# Patient Record
Sex: FEMALE | Race: WHITE | HISPANIC OR LATINO
[De-identification: names, ages, dates, MRNs, and addresses within clinical notes are randomized per-mention and may not be internally consistent; named-entity substitution may affect disease eponyms.]

---

## 2017-02-02 ENCOUNTER — FORM ENCOUNTER (OUTPATIENT)
Age: 47
End: 2017-02-02

## 2017-02-03 ENCOUNTER — OUTPATIENT (OUTPATIENT)
Dept: OUTPATIENT SERVICES | Facility: HOSPITAL | Age: 47
LOS: 1 days | End: 2017-02-03
Payer: COMMERCIAL

## 2017-02-03 DIAGNOSIS — Z98.89 OTHER SPECIFIED POSTPROCEDURAL STATES: Chronic | ICD-10-CM

## 2017-02-03 PROCEDURE — 77059 MRI BREAST BILATERAL: CPT | Mod: 26

## 2017-02-03 PROCEDURE — 0159T: CPT | Mod: 26

## 2017-02-03 PROCEDURE — C8937: CPT

## 2017-02-03 PROCEDURE — 77049 MRI BREAST C-+ W/CAD BI: CPT

## 2017-02-03 PROCEDURE — A9579: CPT

## 2017-02-03 PROCEDURE — 77065 DX MAMMO INCL CAD UNI: CPT

## 2017-02-03 PROCEDURE — G0206: CPT | Mod: 26

## 2017-02-07 ENCOUNTER — APPOINTMENT (OUTPATIENT)
Dept: PSYCHIATRY | Facility: CLINIC | Age: 47
End: 2017-02-07

## 2017-02-07 ENCOUNTER — OTHER (OUTPATIENT)
Age: 47
End: 2017-02-07

## 2017-02-08 ENCOUNTER — RESULT REVIEW (OUTPATIENT)
Age: 47
End: 2017-02-08

## 2017-02-08 ENCOUNTER — FORM ENCOUNTER (OUTPATIENT)
Age: 47
End: 2017-02-08

## 2017-02-09 ENCOUNTER — OUTPATIENT (OUTPATIENT)
Dept: OUTPATIENT SERVICES | Facility: HOSPITAL | Age: 47
LOS: 1 days | End: 2017-02-09
Payer: COMMERCIAL

## 2017-02-09 DIAGNOSIS — Z98.89 OTHER SPECIFIED POSTPROCEDURAL STATES: Chronic | ICD-10-CM

## 2017-02-09 PROCEDURE — 19083 BX BREAST 1ST LESION US IMAG: CPT

## 2017-02-09 PROCEDURE — 76642 ULTRASOUND BREAST LIMITED: CPT

## 2017-02-09 PROCEDURE — 88305 TISSUE EXAM BY PATHOLOGIST: CPT

## 2017-02-09 PROCEDURE — 76642 ULTRASOUND BREAST LIMITED: CPT | Mod: 26,RT

## 2017-02-09 PROCEDURE — A4648: CPT

## 2017-02-09 PROCEDURE — 19083 BX BREAST 1ST LESION US IMAG: CPT | Mod: RT

## 2017-02-09 PROCEDURE — 77065 DX MAMMO INCL CAD UNI: CPT

## 2017-02-09 PROCEDURE — G0206: CPT | Mod: 26

## 2017-02-10 LAB — SURGICAL PATHOLOGY STUDY: SIGNIFICANT CHANGE UP

## 2017-03-02 ENCOUNTER — APPOINTMENT (OUTPATIENT)
Dept: NEUROLOGY | Facility: CLINIC | Age: 47
End: 2017-03-02

## 2017-03-02 VITALS
DIASTOLIC BLOOD PRESSURE: 81 MMHG | BODY MASS INDEX: 26.46 KG/M2 | OXYGEN SATURATION: 98 % | HEART RATE: 101 BPM | TEMPERATURE: 97.3 F | WEIGHT: 155 LBS | SYSTOLIC BLOOD PRESSURE: 137 MMHG | HEIGHT: 64 IN

## 2017-03-02 DIAGNOSIS — Z80.9 FAMILY HISTORY OF MALIGNANT NEOPLASM, UNSPECIFIED: ICD-10-CM

## 2017-03-02 DIAGNOSIS — Z87.19 PERSONAL HISTORY OF OTHER DISEASES OF THE DIGESTIVE SYSTEM: ICD-10-CM

## 2017-03-02 DIAGNOSIS — Z82.49 FAMILY HISTORY OF ISCHEMIC HEART DISEASE AND OTHER DISEASES OF THE CIRCULATORY SYSTEM: ICD-10-CM

## 2017-03-02 DIAGNOSIS — Z86.59 PERSONAL HISTORY OF OTHER MENTAL AND BEHAVIORAL DISORDERS: ICD-10-CM

## 2017-03-02 DIAGNOSIS — Z87.39 PERSONAL HISTORY OF OTHER DISEASES OF THE MUSCULOSKELETAL SYSTEM AND CONNECTIVE TISSUE: ICD-10-CM

## 2017-03-02 DIAGNOSIS — M54.2 CERVICALGIA: ICD-10-CM

## 2017-03-02 DIAGNOSIS — N60.92 UNSPECIFIED BENIGN MAMMARY DYSPLASIA OF LEFT BREAST: ICD-10-CM

## 2017-03-02 DIAGNOSIS — N63 UNSPECIFIED LUMP IN BREAST: ICD-10-CM

## 2017-03-02 DIAGNOSIS — Z80.42 FAMILY HISTORY OF MALIGNANT NEOPLASM OF PROSTATE: ICD-10-CM

## 2017-03-02 DIAGNOSIS — R20.2 PARESTHESIA OF SKIN: ICD-10-CM

## 2017-03-02 DIAGNOSIS — R20.0 ANESTHESIA OF SKIN: ICD-10-CM

## 2017-03-02 DIAGNOSIS — Z80.0 FAMILY HISTORY OF MALIGNANT NEOPLASM OF DIGESTIVE ORGANS: ICD-10-CM

## 2017-03-02 DIAGNOSIS — M79.605 PAIN IN LEFT LEG: ICD-10-CM

## 2017-03-02 RX ORDER — BUPROPION HYDROCHLORIDE 150 MG/1
150 TABLET, EXTENDED RELEASE ORAL
Qty: 180 | Refills: 0 | Status: DISCONTINUED | COMMUNITY
Start: 2016-07-18

## 2017-03-03 LAB
ALBUMIN MFR SERPL ELPH: 58.3 %
ALBUMIN SERPL-MCNC: 4.5 G/DL
ALBUMIN/GLOB SERPL: 1.4 RATIO
ALPHA1 GLOB MFR SERPL ELPH: 3.5 %
ALPHA1 GLOB SERPL ELPH-MCNC: 0.3 G/DL
ALPHA2 GLOB MFR SERPL ELPH: 9 %
ALPHA2 GLOB SERPL ELPH-MCNC: 0.7 G/DL
B-GLOBULIN MFR SERPL ELPH: 11.3 %
B-GLOBULIN SERPL ELPH-MCNC: 0.9 G/DL
ENA SS-A AB SER IA-ACNC: <0.2 AL
ENA SS-B AB SER IA-ACNC: 1 AL
GAMMA GLOB FLD ELPH-MCNC: 1.4 G/DL
GAMMA GLOB MFR SERPL ELPH: 17.9 %
INTERPRETATION SERPL IEP-IMP: NORMAL
PROT SERPL-MCNC: 7.8 G/DL
PROT SERPL-MCNC: 7.8 G/DL
RPR SER-TITR: NORMAL

## 2017-03-05 LAB — HBA1C MFR BLD HPLC: 5.5 %

## 2017-03-06 LAB — VIT B1 SERPL-MCNC: 249.6 NMOL/L

## 2017-04-10 ENCOUNTER — APPOINTMENT (OUTPATIENT)
Dept: DERMATOLOGY | Facility: CLINIC | Age: 47
End: 2017-04-10

## 2017-04-10 VITALS
WEIGHT: 155 LBS | SYSTOLIC BLOOD PRESSURE: 110 MMHG | DIASTOLIC BLOOD PRESSURE: 70 MMHG | BODY MASS INDEX: 26.46 KG/M2 | HEIGHT: 64 IN

## 2017-04-10 DIAGNOSIS — D22.9 MELANOCYTIC NEVI, UNSPECIFIED: ICD-10-CM

## 2017-04-10 DIAGNOSIS — Z12.83 ENCOUNTER FOR SCREENING FOR MALIGNANT NEOPLASM OF SKIN: ICD-10-CM

## 2017-04-10 DIAGNOSIS — L82.0 INFLAMED SEBORRHEIC KERATOSIS: ICD-10-CM

## 2017-05-26 ENCOUNTER — APPOINTMENT (OUTPATIENT)
Dept: NEUROLOGY | Facility: CLINIC | Age: 47
End: 2017-05-26

## 2017-05-26 VITALS
SYSTOLIC BLOOD PRESSURE: 111 MMHG | WEIGHT: 150 LBS | TEMPERATURE: 98 F | HEIGHT: 64 IN | DIASTOLIC BLOOD PRESSURE: 79 MMHG | OXYGEN SATURATION: 97 % | HEART RATE: 86 BPM | BODY MASS INDEX: 25.61 KG/M2

## 2017-07-06 ENCOUNTER — OTHER (OUTPATIENT)
Age: 47
End: 2017-07-06

## 2017-07-06 RX ORDER — BUPROPION HYDROCHLORIDE 150 MG/1
150 TABLET, FILM COATED, EXTENDED RELEASE ORAL TWICE DAILY
Qty: 60 | Refills: 3 | Status: ACTIVE | COMMUNITY
Start: 2017-02-07 | End: 1900-01-01

## 2017-08-26 ENCOUNTER — OUTPATIENT (OUTPATIENT)
Dept: OUTPATIENT SERVICES | Facility: HOSPITAL | Age: 47
LOS: 1 days | End: 2017-08-26
Payer: COMMERCIAL

## 2017-08-26 DIAGNOSIS — Z98.89 OTHER SPECIFIED POSTPROCEDURAL STATES: Chronic | ICD-10-CM

## 2017-08-26 PROCEDURE — 72158 MRI LUMBAR SPINE W/O & W/DYE: CPT | Mod: 26

## 2017-08-26 PROCEDURE — 72158 MRI LUMBAR SPINE W/O & W/DYE: CPT

## 2017-08-26 PROCEDURE — A9585: CPT

## 2017-09-25 ENCOUNTER — APPOINTMENT (OUTPATIENT)
Dept: OBGYN | Facility: CLINIC | Age: 47
End: 2017-09-25
Payer: COMMERCIAL

## 2017-09-25 VITALS
SYSTOLIC BLOOD PRESSURE: 130 MMHG | DIASTOLIC BLOOD PRESSURE: 90 MMHG | HEIGHT: 64 IN | WEIGHT: 154 LBS | BODY MASS INDEX: 26.29 KG/M2

## 2017-09-25 DIAGNOSIS — G89.29 PELVIC AND PERINEAL PAIN: ICD-10-CM

## 2017-09-25 DIAGNOSIS — Z01.419 ENCOUNTER FOR GYNECOLOGICAL EXAMINATION (GENERAL) (ROUTINE) W/OUT ABNORMAL FINDINGS: ICD-10-CM

## 2017-09-25 DIAGNOSIS — R10.2 PELVIC AND PERINEAL PAIN: ICD-10-CM

## 2017-09-25 LAB
HCG UR QL: NEGATIVE
QUALITY CONTROL: YES

## 2017-09-25 PROCEDURE — 99396 PREV VISIT EST AGE 40-64: CPT

## 2017-09-25 PROCEDURE — 81025 URINE PREGNANCY TEST: CPT

## 2017-09-25 RX ORDER — GLUCOSAMINE SULFATE DIPOT CHLR 1000 MG
TABLET ORAL DAILY
Refills: 0 | Status: ACTIVE | COMMUNITY

## 2017-09-25 RX ORDER — UBIDECARENONE 100 MG
100 CAPSULE ORAL DAILY
Refills: 0 | Status: ACTIVE | COMMUNITY

## 2017-09-26 ENCOUNTER — TRANSCRIPTION ENCOUNTER (OUTPATIENT)
Age: 47
End: 2017-09-26

## 2017-09-26 LAB
APPEARANCE: CLEAR
BILIRUBIN URINE: NEGATIVE
BLOOD URINE: NEGATIVE
COLOR: YELLOW
GLUCOSE QUALITATIVE U: NORMAL MG/DL
KETONES URINE: NEGATIVE
LEUKOCYTE ESTERASE URINE: NEGATIVE
NITRITE URINE: NEGATIVE
PH URINE: 7
PROTEIN URINE: NEGATIVE MG/DL
SPECIFIC GRAVITY URINE: 1.01
UROBILINOGEN URINE: NORMAL MG/DL

## 2017-09-27 LAB
C TRACH RRNA SPEC QL NAA+PROBE: NORMAL
HPV HIGH+LOW RISK DNA PNL CVX: NEGATIVE
N GONORRHOEA RRNA SPEC QL NAA+PROBE: NORMAL
SOURCE TP AMPLIFICATION: NORMAL

## 2017-09-28 ENCOUNTER — TRANSCRIPTION ENCOUNTER (OUTPATIENT)
Age: 47
End: 2017-09-28

## 2017-10-02 ENCOUNTER — TRANSCRIPTION ENCOUNTER (OUTPATIENT)
Age: 47
End: 2017-10-02

## 2017-10-02 LAB — CYTOLOGY CVX/VAG DOC THIN PREP: NORMAL

## 2017-10-11 ENCOUNTER — FORM ENCOUNTER (OUTPATIENT)
Age: 47
End: 2017-10-11

## 2017-10-12 ENCOUNTER — OUTPATIENT (OUTPATIENT)
Dept: OUTPATIENT SERVICES | Facility: HOSPITAL | Age: 47
LOS: 1 days | End: 2017-10-12
Payer: COMMERCIAL

## 2017-10-12 DIAGNOSIS — Z98.89 OTHER SPECIFIED POSTPROCEDURAL STATES: Chronic | ICD-10-CM

## 2017-10-12 PROCEDURE — 76830 TRANSVAGINAL US NON-OB: CPT | Mod: 26

## 2017-10-12 PROCEDURE — 76641 ULTRASOUND BREAST COMPLETE: CPT

## 2017-10-12 PROCEDURE — 76641 ULTRASOUND BREAST COMPLETE: CPT | Mod: 26,RT

## 2017-10-12 PROCEDURE — 76830 TRANSVAGINAL US NON-OB: CPT

## 2017-10-13 ENCOUNTER — OTHER (OUTPATIENT)
Age: 47
End: 2017-10-13

## 2017-10-23 ENCOUNTER — RESULT REVIEW (OUTPATIENT)
Age: 47
End: 2017-10-23

## 2017-11-30 ENCOUNTER — RX RENEWAL (OUTPATIENT)
Age: 47
End: 2017-11-30

## 2017-11-30 RX ORDER — BUPROPION HYDROCHLORIDE 150 MG/1
150 TABLET, EXTENDED RELEASE ORAL TWICE DAILY
Qty: 60 | Refills: 0 | Status: ACTIVE | COMMUNITY
Start: 2017-11-30 | End: 1900-01-01

## 2017-12-21 ENCOUNTER — FORM ENCOUNTER (OUTPATIENT)
Age: 47
End: 2017-12-21

## 2017-12-22 ENCOUNTER — OUTPATIENT (OUTPATIENT)
Dept: OUTPATIENT SERVICES | Facility: HOSPITAL | Age: 47
LOS: 1 days | End: 2017-12-22
Payer: COMMERCIAL

## 2017-12-22 DIAGNOSIS — Z98.89 OTHER SPECIFIED POSTPROCEDURAL STATES: Chronic | ICD-10-CM

## 2017-12-22 PROCEDURE — G0204: CPT | Mod: 26

## 2017-12-22 PROCEDURE — 77066 DX MAMMO INCL CAD BI: CPT

## 2017-12-28 ENCOUNTER — OTHER (OUTPATIENT)
Age: 47
End: 2017-12-28

## 2018-01-05 ENCOUNTER — RX RENEWAL (OUTPATIENT)
Age: 48
End: 2018-01-05

## 2018-01-08 ENCOUNTER — RX RENEWAL (OUTPATIENT)
Age: 48
End: 2018-01-08

## 2018-02-28 ENCOUNTER — TRANSCRIPTION ENCOUNTER (OUTPATIENT)
Age: 48
End: 2018-02-28

## 2018-03-01 ENCOUNTER — TRANSCRIPTION ENCOUNTER (OUTPATIENT)
Age: 48
End: 2018-03-01

## 2018-06-10 ENCOUNTER — TRANSCRIPTION ENCOUNTER (OUTPATIENT)
Age: 48
End: 2018-06-10

## 2018-08-22 ENCOUNTER — FORM ENCOUNTER (OUTPATIENT)
Age: 48
End: 2018-08-22

## 2018-08-23 ENCOUNTER — APPOINTMENT (OUTPATIENT)
Dept: MAMMOGRAPHY | Facility: HOSPITAL | Age: 48
End: 2018-08-23
Payer: COMMERCIAL

## 2018-08-23 ENCOUNTER — OUTPATIENT (OUTPATIENT)
Dept: OUTPATIENT SERVICES | Facility: HOSPITAL | Age: 48
LOS: 1 days | End: 2018-08-23
Payer: COMMERCIAL

## 2018-08-23 DIAGNOSIS — Z98.89 OTHER SPECIFIED POSTPROCEDURAL STATES: Chronic | ICD-10-CM

## 2018-08-23 PROCEDURE — 77065 DX MAMMO INCL CAD UNI: CPT

## 2018-08-23 PROCEDURE — 77065 DX MAMMO INCL CAD UNI: CPT | Mod: 26,LT

## 2019-02-07 ENCOUNTER — FORM ENCOUNTER (OUTPATIENT)
Age: 49
End: 2019-02-07

## 2019-02-08 ENCOUNTER — OUTPATIENT (OUTPATIENT)
Dept: OUTPATIENT SERVICES | Facility: HOSPITAL | Age: 49
LOS: 1 days | End: 2019-02-08
Payer: COMMERCIAL

## 2019-02-08 ENCOUNTER — APPOINTMENT (OUTPATIENT)
Dept: MAMMOGRAPHY | Facility: HOSPITAL | Age: 49
End: 2019-02-08
Payer: COMMERCIAL

## 2019-02-08 ENCOUNTER — CHART COPY (OUTPATIENT)
Age: 49
End: 2019-02-08

## 2019-02-08 DIAGNOSIS — Z98.89 OTHER SPECIFIED POSTPROCEDURAL STATES: Chronic | ICD-10-CM

## 2019-02-08 PROCEDURE — G0279: CPT | Mod: 26

## 2019-02-08 PROCEDURE — 77066 DX MAMMO INCL CAD BI: CPT

## 2019-02-08 PROCEDURE — 77066 DX MAMMO INCL CAD BI: CPT | Mod: 26

## 2019-02-08 PROCEDURE — G0279: CPT

## 2019-02-13 ENCOUNTER — FORM ENCOUNTER (OUTPATIENT)
Age: 49
End: 2019-02-13

## 2019-02-14 ENCOUNTER — OUTPATIENT (OUTPATIENT)
Dept: OUTPATIENT SERVICES | Facility: HOSPITAL | Age: 49
LOS: 1 days | End: 2019-02-14
Payer: COMMERCIAL

## 2019-02-14 ENCOUNTER — APPOINTMENT (OUTPATIENT)
Dept: ULTRASOUND IMAGING | Facility: HOSPITAL | Age: 49
End: 2019-02-14
Payer: COMMERCIAL

## 2019-02-14 DIAGNOSIS — Z98.89 OTHER SPECIFIED POSTPROCEDURAL STATES: Chronic | ICD-10-CM

## 2019-02-14 PROCEDURE — 76641 ULTRASOUND BREAST COMPLETE: CPT | Mod: 26,50

## 2019-02-14 PROCEDURE — 76641 ULTRASOUND BREAST COMPLETE: CPT

## 2019-02-19 ENCOUNTER — CHART COPY (OUTPATIENT)
Age: 49
End: 2019-02-19

## 2019-02-19 DIAGNOSIS — N63.20 UNSPECIFIED LUMP IN THE LEFT BREAST, UNSPECIFIED QUADRANT: ICD-10-CM

## 2019-02-20 ENCOUNTER — CHART COPY (OUTPATIENT)
Age: 49
End: 2019-02-20

## 2019-02-28 ENCOUNTER — FORM ENCOUNTER (OUTPATIENT)
Age: 49
End: 2019-02-28

## 2019-02-28 ENCOUNTER — APPOINTMENT (OUTPATIENT)
Dept: MRI IMAGING | Facility: HOSPITAL | Age: 49
End: 2019-02-28
Payer: COMMERCIAL

## 2019-02-28 ENCOUNTER — OUTPATIENT (OUTPATIENT)
Dept: OUTPATIENT SERVICES | Facility: HOSPITAL | Age: 49
LOS: 1 days | End: 2019-02-28
Payer: COMMERCIAL

## 2019-02-28 DIAGNOSIS — Z98.89 OTHER SPECIFIED POSTPROCEDURAL STATES: Chronic | ICD-10-CM

## 2019-03-01 ENCOUNTER — RESULT REVIEW (OUTPATIENT)
Age: 49
End: 2019-03-01

## 2019-03-01 ENCOUNTER — OUTPATIENT (OUTPATIENT)
Dept: OUTPATIENT SERVICES | Facility: HOSPITAL | Age: 49
LOS: 1 days | End: 2019-03-01
Payer: COMMERCIAL

## 2019-03-01 ENCOUNTER — APPOINTMENT (OUTPATIENT)
Dept: ULTRASOUND IMAGING | Facility: HOSPITAL | Age: 49
End: 2019-03-01
Payer: COMMERCIAL

## 2019-03-01 DIAGNOSIS — Z98.89 OTHER SPECIFIED POSTPROCEDURAL STATES: Chronic | ICD-10-CM

## 2019-03-01 PROCEDURE — 19083 BX BREAST 1ST LESION US IMAG: CPT | Mod: LT

## 2019-03-01 PROCEDURE — 77049 MRI BREAST C-+ W/CAD BI: CPT

## 2019-03-01 PROCEDURE — 77065 DX MAMMO INCL CAD UNI: CPT

## 2019-03-01 PROCEDURE — 77049 MRI BREAST C-+ W/CAD BI: CPT | Mod: 26

## 2019-03-01 PROCEDURE — A9585: CPT

## 2019-03-01 PROCEDURE — C8937: CPT

## 2019-03-01 PROCEDURE — 19083 BX BREAST 1ST LESION US IMAG: CPT

## 2019-03-01 PROCEDURE — 77065 DX MAMMO INCL CAD UNI: CPT | Mod: 26,LT

## 2019-03-01 PROCEDURE — A4648: CPT

## 2019-03-01 PROCEDURE — 88305 TISSUE EXAM BY PATHOLOGIST: CPT

## 2019-03-02 ENCOUNTER — TRANSCRIPTION ENCOUNTER (OUTPATIENT)
Age: 49
End: 2019-03-02

## 2019-03-04 LAB — SURGICAL PATHOLOGY STUDY: SIGNIFICANT CHANGE UP

## 2019-03-11 ENCOUNTER — RESULT REVIEW (OUTPATIENT)
Age: 49
End: 2019-03-11

## 2019-03-12 ENCOUNTER — APPOINTMENT (OUTPATIENT)
Dept: BREAST CENTER | Facility: CLINIC | Age: 49
End: 2019-03-12
Payer: COMMERCIAL

## 2019-03-12 VITALS
WEIGHT: 148 LBS | HEIGHT: 64 IN | HEART RATE: 77 BPM | SYSTOLIC BLOOD PRESSURE: 117 MMHG | DIASTOLIC BLOOD PRESSURE: 79 MMHG | BODY MASS INDEX: 25.27 KG/M2

## 2019-03-12 DIAGNOSIS — Z87.891 PERSONAL HISTORY OF NICOTINE DEPENDENCE: ICD-10-CM

## 2019-03-12 DIAGNOSIS — D24.9 BENIGN NEOPLASM OF UNSPECIFIED BREAST: ICD-10-CM

## 2019-03-12 DIAGNOSIS — R92.8 OTHER ABNORMAL AND INCONCLUSIVE FINDINGS ON DIAGNOSTIC IMAGING OF BREAST: ICD-10-CM

## 2019-03-12 PROCEDURE — 99214 OFFICE O/P EST MOD 30 MIN: CPT

## 2019-03-12 RX ORDER — MELATONIN 3 MG
TABLET ORAL
Refills: 0 | Status: ACTIVE | COMMUNITY

## 2019-03-12 RX ORDER — NORETHINDRONE 0.35 MG/1
0.35 TABLET ORAL DAILY
Qty: 1 | Refills: 3 | Status: DISCONTINUED | COMMUNITY
Start: 2017-09-25 | End: 2019-03-12

## 2019-03-12 RX ORDER — B-COMPLEX WITH VITAMIN C
TABLET ORAL DAILY
Refills: 0 | Status: DISCONTINUED | COMMUNITY
End: 2019-03-12

## 2019-03-15 ENCOUNTER — APPOINTMENT (OUTPATIENT)
Dept: MRI IMAGING | Facility: HOSPITAL | Age: 49
End: 2019-03-15
Payer: COMMERCIAL

## 2019-03-15 ENCOUNTER — OUTPATIENT (OUTPATIENT)
Dept: OUTPATIENT SERVICES | Facility: HOSPITAL | Age: 49
LOS: 1 days | End: 2019-03-15
Payer: COMMERCIAL

## 2019-03-15 DIAGNOSIS — Z98.89 OTHER SPECIFIED POSTPROCEDURAL STATES: Chronic | ICD-10-CM

## 2019-03-15 PROCEDURE — 72148 MRI LUMBAR SPINE W/O DYE: CPT

## 2019-03-15 PROCEDURE — 72148 MRI LUMBAR SPINE W/O DYE: CPT | Mod: 26

## 2019-03-25 ENCOUNTER — APPOINTMENT (OUTPATIENT)
Dept: MRI IMAGING | Facility: HOSPITAL | Age: 49
End: 2019-03-25

## 2019-03-25 ENCOUNTER — OUTPATIENT (OUTPATIENT)
Dept: OUTPATIENT SERVICES | Facility: HOSPITAL | Age: 49
LOS: 1 days | End: 2019-03-25
Payer: COMMERCIAL

## 2019-03-25 ENCOUNTER — RESULT REVIEW (OUTPATIENT)
Age: 49
End: 2019-03-25

## 2019-03-25 DIAGNOSIS — Z98.89 OTHER SPECIFIED POSTPROCEDURAL STATES: Chronic | ICD-10-CM

## 2019-03-25 PROCEDURE — A9585: CPT

## 2019-03-25 PROCEDURE — A4648: CPT

## 2019-03-25 PROCEDURE — 19085 BX BREAST 1ST LESION MR IMAG: CPT | Mod: LT

## 2019-03-25 PROCEDURE — 77066 DX MAMMO INCL CAD BI: CPT

## 2019-03-25 PROCEDURE — 88305 TISSUE EXAM BY PATHOLOGIST: CPT

## 2019-03-25 PROCEDURE — 19086 BX BREAST ADD LESION MR IMAG: CPT

## 2019-03-25 PROCEDURE — 19086 BX BREAST ADD LESION MR IMAG: CPT | Mod: RT

## 2019-03-25 PROCEDURE — 19085 BX BREAST 1ST LESION MR IMAG: CPT

## 2019-03-25 PROCEDURE — 77066 DX MAMMO INCL CAD BI: CPT | Mod: 26

## 2019-03-26 LAB — SURGICAL PATHOLOGY STUDY: SIGNIFICANT CHANGE UP

## 2019-04-25 ENCOUNTER — APPOINTMENT (OUTPATIENT)
Dept: MRI IMAGING | Facility: HOSPITAL | Age: 49
End: 2019-04-25

## 2019-04-25 ENCOUNTER — OUTPATIENT (OUTPATIENT)
Dept: OUTPATIENT SERVICES | Facility: HOSPITAL | Age: 49
LOS: 1 days | End: 2019-04-25
Payer: COMMERCIAL

## 2019-04-25 DIAGNOSIS — Z98.89 OTHER SPECIFIED POSTPROCEDURAL STATES: Chronic | ICD-10-CM

## 2019-04-25 PROCEDURE — 72141 MRI NECK SPINE W/O DYE: CPT | Mod: 26

## 2019-04-25 PROCEDURE — 72141 MRI NECK SPINE W/O DYE: CPT

## 2019-08-23 ENCOUNTER — APPOINTMENT (OUTPATIENT)
Dept: ULTRASOUND IMAGING | Facility: HOSPITAL | Age: 49
End: 2019-08-23
Payer: COMMERCIAL

## 2019-08-23 ENCOUNTER — OUTPATIENT (OUTPATIENT)
Dept: OUTPATIENT SERVICES | Facility: HOSPITAL | Age: 49
LOS: 1 days | End: 2019-08-23
Payer: COMMERCIAL

## 2019-08-23 DIAGNOSIS — Z98.89 OTHER SPECIFIED POSTPROCEDURAL STATES: Chronic | ICD-10-CM

## 2019-08-23 PROCEDURE — 76641 ULTRASOUND BREAST COMPLETE: CPT

## 2019-08-23 PROCEDURE — 76641 ULTRASOUND BREAST COMPLETE: CPT | Mod: 26,LT

## 2019-08-26 ENCOUNTER — APPOINTMENT (OUTPATIENT)
Dept: BREAST CENTER | Facility: CLINIC | Age: 49
End: 2019-08-26
Payer: COMMERCIAL

## 2019-08-26 VITALS
SYSTOLIC BLOOD PRESSURE: 119 MMHG | BODY MASS INDEX: 25.27 KG/M2 | HEIGHT: 64 IN | WEIGHT: 148 LBS | DIASTOLIC BLOOD PRESSURE: 82 MMHG | HEART RATE: 95 BPM

## 2019-08-26 DIAGNOSIS — Z91.89 OTHER SPECIFIED PERSONAL RISK FACTORS, NOT ELSEWHERE CLASSIFIED: ICD-10-CM

## 2019-08-26 DIAGNOSIS — N62 HYPERTROPHY OF BREAST: ICD-10-CM

## 2019-08-26 DIAGNOSIS — D24.2 BENIGN NEOPLASM OF LEFT BREAST: ICD-10-CM

## 2019-08-26 PROCEDURE — 99214 OFFICE O/P EST MOD 30 MIN: CPT

## 2019-08-26 NOTE — DATA REVIEWED
[FreeTextEntry1] : -- 8/23/18 ([facility]) left mmg: dense breasts. Single benign-appearing calc superior to the left nipple, unchanged. Rec 6-mo f/u and b/l mmg at that time. BR3. \par -- 2/8/19 ([facility]) mmg b/l: dense breasts; few benign appearing microcalcs seen b/l, unchanged. Microclip in mid lower right breast. No new suspicious microcalcs, masses or areas of distortion seen. BR2, benign; rec routine screening. 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 Left, 0.8 cm cyst 12:00 8N. 0.5 x 0.3 x 0.5 cm hypoechoic lesion w/ internal vascularity at 8:00 4N for which US guided bx is rec. BR4. \par -- 3/1/19 ([facility]) MRI b/l: dense fibroglandular tissue. Right breast 1.5 focal area of clumped non-mass enhancement 1:00 4.7N, new. Rec MRI guided bx. Left, clumped and confluent non-mass enhancement at 12:00-1:00 spanning approx 6.0 x 3.3 cm, new. Rec MRI guided bx. BR4. \par -- 3/1/19 ([facility]) US guided vacuum assist core bx of left breast 8:00 4N hypoechoic lesion: fibroadenoma. Pending concordance report.

## 2019-08-26 NOTE — ASSESSMENT
[FreeTextEntry1] : Sherine is a 47 yo female, here for f/u after completing a biopsy (path reported benign) in March & left breast US in August (BR2). \par \par -- 8/23/18 ([facility]) left mmg: dense breasts. Single benign-appearing calc superior to the left nipple, unchanged. Rec 6-mo f/u and b/l mmg at that time. BR3. \par -- 2/8/19 ([facility]) mmg b/l: dense breasts; few benign appearing microcalcs seen b/l, unchanged. Microclip in mid lower right breast. No new suspicious microcalcs, masses or areas of distortion seen. BR2, benign; rec routine screening. \par -- 2/14/19 (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 Left, 0.8 cm cyst 12:00 8N. 0.5 x 0.3 x 0.5 cm hypoechoic lesion w/ internal vascularity at 8:00 4N for which US guided bx is rec. BR4. \par -- 3/1/19 ([facility]) MRI b/l: dense fibroglandular tissue. Right breast 1.5 focal area of clumped non-mass enhancement 1:00 4.7N, new. Rec MRI guided bx. Left, clumped and confluent non-mass enhancement at 12:00-1:00 spanning approx 6.0 x 3.3 cm, new. Rec MRI guided bx. BR4. \par -- 3/1/19 ([facility]) US guided vacuum assist core bx of left breast 8:00 4N hypoechoic lesion: fibroadenoma. Recommend 6-month f/u left breast US.  \par --3/25/19 ([facility]) MRI bx: right 1:00, benign breast tissue w/ sclerosing adenosis, stromal fibrosis, UDH, and apocrine metaplasia. Left 1:00, benign breast tissue w/ fibroadenomatoid changes. Concordant. 6-month f/u left breast US from the US guided bx performed on 3/1/19 is recommended. 6-month g/u b/l breast US is recommended for the biopsy dated 3/25/19-reviewed with Dr. Whitley, the biopsies were done for MRI findings and benign and concordant, no bilat u/s needed, just the left u/s for the FA on bx.  \par --8/23/19 ([facility]) left breast US: 0.5 x 0.3 x 0.4 hypoechoic lesion at 8n3, unchanged, path reported fibroadenomatoid changes. Continued 6-mo f/u is recommended at which time the patient is due for b/l mammograms. BI-RADS 3.\par \par PmHx: s/p L exc bx for calcs a/w ADH (2016), final path benign/inflammatory\par \par FmHx: pancreatic ca (father dx 56), lung cancer (maternal cousin dx 29; paternal grandmother dx 72) colon cancer (maternal grandfather dx 80's) stomach cancer (maternal grandmother dx 90's). \par Had genetic testing done, reported to be MTHFR gene + (increases risk of inflammation, no association with malignancy) (completed last year).\par CBE:LYNNE\par Rec bilat mmg and u/s 2/20 and if neg then MRI 4/20 to space out the studies so eventually she will be alternating mmg and u/s with MRI q mos. High risk clinic.

## 2019-08-26 NOTE — PAST MEDICAL HISTORY
[Menarche Age ____] : age at menarche was [unfilled] [Definite ___ (Date)] : the last menstrual period was [unfilled] [Regular Cycle Intervals] : have been regular [Total Preg ___] : G[unfilled] [FreeTextEntry6] : N/A [FreeTextEntry7] : N/A [FreeTextEntry8] : N/A

## 2019-08-26 NOTE — HISTORY OF PRESENT ILLNESS
[FreeTextEntry1] : 49 yo female previously under the care of Dr. Diaz; last in office 8/5/16, here for f.u after MRI (was done for screening per Dr. Diaz) and biopsy.\par -- 8/23/18 ([facility]) left mmg: dense breasts. Single benign-appearing calc superior to the left nipple, unchanged. Rec 6-mo f/u and b/l mmg at that time. BR3. \par -- 2/8/19 ([facility]) mmg b/l: dense breasts; few benign appearing microcalcs seen b/l, unchanged. Microclip in mid lower right breast. No new suspicious microcalcs, masses or areas of distortion seen. BR2, benign; rec routine screening. \par -- 2/14/19 (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 Left, 0.8 cm cyst 12:00 8N. 0.5 x 0.3 x 0.5 cm hypoechoic lesion w/ internal vascularity at 8:00 4N for which US guided bx is rec. BR4. \par -- 3/1/19 ([facility]) MRI b/l: dense fibroglandular tissue. Right breast 1.5 focal area of clumped non-mass enhancement 1:00 4.7N, new. Rec MRI guided bx. Left, clumped and confluent non-mass enhancement at 12:00-1:00 spanning approx 6.0 x 3.3 cm, new. Rec MRI guided bx. BR4. \par -- 3/1/19 ([facility]) US guided vacuum assist core bx of left breast 8:00 4N hypoechoic lesion: fibroadenoma. Pending concordance report. \par PmHx: s/p L exc bx for calcs a/w ADH (2016), final path benign/inflammatory\par FmHx: pancreatic ca (father dx 56), lung cancer (maternal cousin dx 29; paternal grandmother dx 72) colon cancer (maternal grandfather dx 80's) stomach cancer (maternal grandmother dx 90's). \par Pt denies masses, lesions, discharge, or other breast complaints.\par Had genetic testing done, reported to be MTHFR gene + (increases risk of inflammation, no association with malignancy) (completed last year).\par

## 2019-08-26 NOTE — REVIEW OF SYSTEMS
[Negative] : Heme/Lymph [Constipation] : constipation [As Noted in HPI] : as noted in HPI [FreeTextEntry7] : Reflex-sibo [FreeTextEntry9] : Back sciatic pain

## 2019-08-26 NOTE — HISTORY OF PRESENT ILLNESS
[FreeTextEntry1] : Sherine is a 49 yo female, here for f/u after completing a biopsy (path reported benign) in March & left breast US in August (BR2). \par \par -- 8/23/18 ([facility]) left mmg: dense breasts. Single benign-appearing calc superior to the left nipple, unchanged. Rec 6-mo f/u and b/l mmg at that time. BR3. \par -- 2/8/19 ([facility]) mmg b/l: dense breasts; few benign appearing microcalcs seen b/l, unchanged. Microclip in mid lower right breast. No new suspicious microcalcs, masses or areas of distortion seen. BR2, benign; rec routine screening. \par -- 2/14/19 (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 Left, 0.8 cm cyst 12:00 8N. 0.5 x 0.3 x 0.5 cm hypoechoic lesion w/ internal vascularity at 8:00 4N for which US guided bx is rec. BR4. \par -- 3/1/19 ([facility]) MRI b/l: dense fibroglandular tissue. Right breast 1.5 focal area of clumped non-mass enhancement 1:00 4.7N, new. Rec MRI guided bx. Left, clumped and confluent non-mass enhancement at 12:00-1:00 spanning approx 6.0 x 3.3 cm, new. Rec MRI guided bx. BR4. \par -- 3/1/19 ([facility]) US guided vacuum assist core bx of left breast 8:00 4N hypoechoic lesion: fibroadenoma. Recommend 6-month f/u left breast US.  \par --3/25/19 ([facility]) MRI bx: right 1:00, benign breast tissue w/ sclerosing adenosis, stromal fibrosis, UDH, and apocrine metaplasia. Left 1:00, benign breast tissue w/ fibroadenomatoid changes. Concordant. 6-month f/u left breast US from the US guided bx performed on 3/1/19 is recommended. 6-month g/u b/l breast US is recommended for the biopsy dated 3/25/19-reviewed with Dr. Whitley, the biopsies were done for MRI findings and benign and concordant, no bilat u/s needed, just the left u/s for the FA on bx.  \par --8/23/19 ([facility]) left breast US: 0.5 x 0.3 x 0.4 hypoechoic lesion at 8n3, unchanged, path reported fibroadenomatoid changes. Continued 6-mo f/u is recommended at which time the patient is due for b/l mammograms. BI-RADS 3.\par \par PmHx: s/p L exc bx for calcs a/w ADH (2016), final path benign/inflammatory\par \par FmHx: pancreatic ca (father dx 56), lung cancer (maternal cousin dx 29; paternal grandmother dx 72) colon cancer (maternal grandfather dx 80's) stomach cancer (maternal grandmother dx 90's). \par Had genetic testing done, reported to be MTHFR gene + (increases risk of inflammation, no association with malignancy) (completed last year).

## 2019-08-26 NOTE — PHYSICAL EXAM
[Normocephalic] : normocephalic [Atraumatic] : atraumatic [Supple] : supple [No Supraclavicular Adenopathy] : no supraclavicular adenopathy [No Thyromegaly] : no thyromegaly [Examined in the supine and seated position] : examined in the supine and seated position [No dominant masses] : no dominant masses in right breast  [No dominant masses] : no dominant masses left breast [No Nipple Retraction] : no left nipple retraction [No Nipple Discharge] : no left nipple discharge [No Axillary Lymphadenopathy] : no left axillary lymphadenopathy [No Edema] : no edema [No Rashes] : no rashes [No Ulceration] : no ulceration [Bra Size: ___] : Bra Size: [unfilled] [No Swelling] : no swelling [Full ROM] : full range of motion [de-identified] : generalized tenderness to palpation [de-identified] : generalized tenderness to palpation; mild ecchymosis to right inner quadrant at previous bx site

## 2019-08-26 NOTE — ASSESSMENT
[FreeTextEntry1] : 47 yo female previously under the care of Dr. Diaz; last in office 8/5/16, here for f.u after MRI (was done for screening per Dr. Diaz) and biopsy.\par -- 8/23/18 ([facility]) left mmg: dense breasts. Single benign-appearing calc superior to the left nipple, unchanged. Rec 6-mo f/u and b/l mmg at that time. BR3. \par -- 2/8/19 ([facility]) mmg b/l: dense breasts; few benign appearing microcalcs seen b/l, unchanged. Microclip in mid lower right breast. No new suspicious microcalcs, masses or areas of distortion seen. BR2, benign; rec routine screening. 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 Left, 0.8 cm cyst 12:00 8N. 0.5 x 0.3 x 0.5 cm hypoechoic lesion w/ internal vascularity at 8:00 4N for which US guided bx is rec. BR4. \par -- 3/1/19 ([facility]) MRI b/l: dense fibroglandular tissue. Right breast 1.5 focal area of clumped non-mass enhancement 1:00 4.7N, new. Rec MRI guided bx. Left, clumped and confluent non-mass enhancement at 12:00-1:00 spanning approx 6.0 x 3.3 cm, new. Rec MRI guided bx. BR4. \par -- 3/1/19 ([facility]) US guided vacuum assist core bx of left breast 8:00 4N hypoechoic lesion: fibroadenoma. Pending concordance report. \par PmHx: s/p L exc bx for calcs a/w ADH (2016), final path benign/inflammatory\par FmHx: pancreatic ca (father dx 56), lung cancer (maternal cousin dx 29; paternal grandmother dx 72) colon cancer (maternal grandfather dx 80's) stomach cancer (maternal grandmother dx 90's). \par Pt denies masses, lesions, discharge, or other breast complaints.\par Had genetic testing done, reported to be MTHFR gene + (increases risk of inflammation, no association with malignancy) (completed last year).\par CBE: generalized tenderness to palpation b/l but no discrete masses or lesions. No suspicious findings. \par Will order MRI bx b/l, f/u in office pending results \par

## 2019-08-26 NOTE — PHYSICAL EXAM
[Normocephalic] : normocephalic [Atraumatic] : atraumatic [Supple] : supple [No Thyromegaly] : no thyromegaly [No Supraclavicular Adenopathy] : no supraclavicular adenopathy [Examined in the supine and seated position] : examined in the supine and seated position [Bra Size: ___] : Bra Size: [unfilled] [No dominant masses] : no dominant masses left breast [No dominant masses] : no dominant masses in right breast  [No Nipple Retraction] : no left nipple retraction [No Nipple Discharge] : no right nipple discharge [No Axillary Lymphadenopathy] : no left axillary lymphadenopathy [No Edema] : no edema [No Rashes] : no rashes [No Ulceration] : no ulceration [de-identified] : PA scar

## 2019-08-26 NOTE — PAST MEDICAL HISTORY
[Menarche Age ____] : age at menarche was [unfilled] [Definite ___ (Date)] : the last menstrual period was [unfilled] [Regular Cycle Intervals] : have been regular [Total Preg ___] : G[unfilled] [FreeTextEntry7] : N/A [FreeTextEntry6] : N/A [FreeTextEntry8] : N/A

## 2019-08-26 NOTE — DATA REVIEWED
[FreeTextEntry1] : -- 8/23/18 ([facility]) left mmg: dense breasts. Single benign-appearing calc superior to the left nipple, unchanged. Rec 6-mo f/u and b/l mmg at that time. BR3. \par -- 2/8/19 ([facility]) mmg b/l: dense breasts; few benign appearing microcalcs seen b/l, unchanged. Microclip in mid lower right breast. No new suspicious microcalcs, masses or areas of distortion seen. BR2, benign; rec routine screening. 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 Left, 0.8 cm cyst 12:00 8N. 0.5 x 0.3 x 0.5 cm hypoechoic lesion w/ internal vascularity at 8:00 4N for which US guided bx is rec. BR4. \par -- 3/1/19 ([facility]) MRI b/l: dense fibroglandular tissue. Right breast 1.5 focal area of clumped non-mass enhancement 1:00 4.7N, new. Rec MRI guided bx. Left, clumped and confluent non-mass enhancement at 12:00-1:00 spanning approx 6.0 x 3.3 cm, new. Rec MRI guided bx. BR4. \par -- 3/1/19 ([facility]) US guided vacuum assist core bx of left breast 8:00 4N hypoechoic lesion: fibroadenoma. Recommend 6-month f/u left breast US.  \par --3/25/19 ([facility]) MRI bx: right 1:00, benign breast tissue w/ sclerosing adenosis, stromal fibrosis, UDH, and apocrine metaplasia. Left 1:00, benign breast tissue w/ fibroadenomatoid changes. Concordant. 6-month f/u left breast US from the US guided bx performed on 3/1/19 is recommended. 6-month g/u b/l breast US is recommended for the biopsy dated 3/25/19-reviewed with Dr. Whitley, the biopsies were done for MRI findings and benign and concordant, no bilat u/s needed, just the left u/s for the FA on bx.  \par --8/23/19 ([facility]) left breast US: 0.5 x 0.3 x 0.4 hypoechoic lesion at 8n3, unchanged, path reported fibroadenomatoid changes. Continued 6-mo f/u is recommended at which time the patient is due for b/l mammograms. BI-RADS 3.

## 2020-03-02 ENCOUNTER — FORM ENCOUNTER (OUTPATIENT)
Age: 50
End: 2020-03-02

## 2020-03-03 ENCOUNTER — APPOINTMENT (OUTPATIENT)
Dept: ULTRASOUND IMAGING | Facility: HOSPITAL | Age: 50
End: 2020-03-03

## 2020-03-03 ENCOUNTER — APPOINTMENT (OUTPATIENT)
Dept: MAMMOGRAPHY | Facility: HOSPITAL | Age: 50
End: 2020-03-03
Payer: COMMERCIAL

## 2020-03-03 ENCOUNTER — OUTPATIENT (OUTPATIENT)
Dept: OUTPATIENT SERVICES | Facility: HOSPITAL | Age: 50
LOS: 1 days | End: 2020-03-03
Payer: COMMERCIAL

## 2020-03-03 DIAGNOSIS — Z98.89 OTHER SPECIFIED POSTPROCEDURAL STATES: Chronic | ICD-10-CM

## 2020-03-03 PROCEDURE — 76641 ULTRASOUND BREAST COMPLETE: CPT

## 2020-03-03 PROCEDURE — 77063 BREAST TOMOSYNTHESIS BI: CPT

## 2020-03-03 PROCEDURE — 76641 ULTRASOUND BREAST COMPLETE: CPT | Mod: 26,50

## 2020-03-03 PROCEDURE — 77067 SCR MAMMO BI INCL CAD: CPT | Mod: 26

## 2020-03-03 PROCEDURE — 77067 SCR MAMMO BI INCL CAD: CPT

## 2020-03-03 PROCEDURE — 77063 BREAST TOMOSYNTHESIS BI: CPT | Mod: 26

## 2020-03-20 ENCOUNTER — APPOINTMENT (OUTPATIENT)
Dept: BREAST CENTER | Facility: CLINIC | Age: 50
End: 2020-03-20

## 2020-06-26 ENCOUNTER — APPOINTMENT (OUTPATIENT)
Dept: MRI IMAGING | Facility: HOSPITAL | Age: 50
End: 2020-06-26

## 2022-01-31 ENCOUNTER — TRANSCRIPTION ENCOUNTER (OUTPATIENT)
Age: 52
End: 2022-01-31

## 2022-08-03 ENCOUNTER — NON-APPOINTMENT (OUTPATIENT)
Age: 52
End: 2022-08-03